# Patient Record
Sex: FEMALE | URBAN - METROPOLITAN AREA
[De-identification: names, ages, dates, MRNs, and addresses within clinical notes are randomized per-mention and may not be internally consistent; named-entity substitution may affect disease eponyms.]

---

## 2021-12-07 ENCOUNTER — OFFICE VISIT (OUTPATIENT)
Dept: URBAN - METROPOLITAN AREA CLINIC 23 | Facility: CLINIC | Age: 31
End: 2021-12-07
Payer: MEDICAID

## 2021-12-07 DIAGNOSIS — H04.123 DRY EYE SYNDROME OF BILATERAL LACRIMAL GLANDS: Primary | ICD-10-CM

## 2021-12-07 PROCEDURE — 92004 COMPRE OPH EXAM NEW PT 1/>: CPT | Performed by: OPTOMETRIST

## 2021-12-07 RX ORDER — ALBUTEROL SULFATE 1.5 MG/3ML
SOLUTION RESPIRATORY (INHALATION)
Qty: 0 | Refills: 0 | Status: ACTIVE
Start: 2021-12-07

## 2021-12-07 ASSESSMENT — INTRAOCULAR PRESSURE
OD: 16
OS: 14

## 2021-12-07 ASSESSMENT — KERATOMETRY
OS: 42.50
OD: 42.00

## 2021-12-07 NOTE — IMPRESSION/PLAN
Impression: Dry eye syndrome of bilateral lacrimal glands: H04.123 Bilateral. Condition: stable. Plan: Examination findings were discussed. Dry eyes can be a chronic problem. Artificial tears are recommended throughout the day. Gel at bedtime may be helpful. Environmental factors can be modified.